# Patient Record
Sex: MALE | Race: WHITE | NOT HISPANIC OR LATINO | Employment: OTHER | RURAL
[De-identification: names, ages, dates, MRNs, and addresses within clinical notes are randomized per-mention and may not be internally consistent; named-entity substitution may affect disease eponyms.]

---

## 2018-07-05 ENCOUNTER — OFFICE VISIT CONVERTED (OUTPATIENT)
Dept: CARDIOLOGY | Facility: CLINIC | Age: 68
End: 2018-07-05
Attending: SPECIALIST

## 2018-07-05 ENCOUNTER — CONVERSION ENCOUNTER (OUTPATIENT)
Dept: OTHER | Facility: HOSPITAL | Age: 68
End: 2018-07-05

## 2018-11-08 ENCOUNTER — OFFICE VISIT CONVERTED (OUTPATIENT)
Dept: CARDIOLOGY | Facility: CLINIC | Age: 68
End: 2018-11-08
Attending: SPECIALIST

## 2019-05-20 ENCOUNTER — HOSPITAL ENCOUNTER (OUTPATIENT)
Dept: NUCLEAR MEDICINE | Facility: HOSPITAL | Age: 69
Discharge: HOME | End: 2019-05-20
Attending: NURSE PRACTITIONER

## 2019-05-23 ENCOUNTER — OFFICE VISIT CONVERTED (OUTPATIENT)
Dept: CARDIOLOGY | Facility: CLINIC | Age: 69
End: 2019-05-23
Attending: SPECIALIST

## 2019-05-23 ENCOUNTER — CONVERSION ENCOUNTER (OUTPATIENT)
Dept: OTHER | Facility: HOSPITAL | Age: 69
End: 2019-05-23

## 2019-07-24 ENCOUNTER — CONVERSION ENCOUNTER (OUTPATIENT)
Dept: NEUROLOGY | Facility: CLINIC | Age: 69
End: 2019-07-24

## 2019-07-24 ENCOUNTER — OFFICE VISIT CONVERTED (OUTPATIENT)
Dept: NEUROSURGERY | Facility: CLINIC | Age: 69
End: 2019-07-24
Attending: PHYSICIAN ASSISTANT

## 2019-08-20 ENCOUNTER — OFFICE VISIT CONVERTED (OUTPATIENT)
Dept: NEUROSURGERY | Facility: CLINIC | Age: 69
End: 2019-08-20
Attending: NEUROLOGICAL SURGERY

## 2019-09-12 ENCOUNTER — OFFICE VISIT CONVERTED (OUTPATIENT)
Dept: CARDIOLOGY | Facility: CLINIC | Age: 69
End: 2019-09-12
Attending: SPECIALIST

## 2019-09-12 ENCOUNTER — CONVERSION ENCOUNTER (OUTPATIENT)
Dept: CARDIOLOGY | Facility: CLINIC | Age: 69
End: 2019-09-12

## 2019-09-17 ENCOUNTER — CONVERSION ENCOUNTER (OUTPATIENT)
Dept: CARDIOLOGY | Facility: CLINIC | Age: 69
End: 2019-09-17
Attending: SPECIALIST

## 2019-12-16 ENCOUNTER — HOSPITAL ENCOUNTER (OUTPATIENT)
Dept: GENERAL RADIOLOGY | Facility: HOSPITAL | Age: 69
Discharge: HOME | End: 2019-12-16
Attending: INTERNAL MEDICINE

## 2020-03-09 ENCOUNTER — OFFICE VISIT CONVERTED (OUTPATIENT)
Dept: CARDIOLOGY | Facility: CLINIC | Age: 70
End: 2020-03-09
Attending: SPECIALIST

## 2020-07-23 ENCOUNTER — HOSPITAL ENCOUNTER (OUTPATIENT)
Dept: GENERAL RADIOLOGY | Facility: HOSPITAL | Age: 70
Discharge: HOME | End: 2020-07-23
Attending: INTERNAL MEDICINE

## 2020-07-23 LAB
CREAT BLD-MCNC: 1.2 MG/DL (ref 0.6–1.4)
GFR SERPLBLD BASED ON 1.73 SQ M-ARVRAT: >60 ML/MIN/{1.73_M2}

## 2021-05-15 VITALS
HEIGHT: 69 IN | WEIGHT: 195 LBS | DIASTOLIC BLOOD PRESSURE: 79 MMHG | BODY MASS INDEX: 28.88 KG/M2 | SYSTOLIC BLOOD PRESSURE: 128 MMHG

## 2021-05-15 VITALS
SYSTOLIC BLOOD PRESSURE: 137 MMHG | DIASTOLIC BLOOD PRESSURE: 80 MMHG | BODY MASS INDEX: 29.21 KG/M2 | WEIGHT: 197.25 LBS | HEIGHT: 69 IN | HEART RATE: 95 BPM

## 2021-05-15 VITALS
DIASTOLIC BLOOD PRESSURE: 82 MMHG | HEART RATE: 102 BPM | HEIGHT: 69 IN | WEIGHT: 189.12 LBS | SYSTOLIC BLOOD PRESSURE: 143 MMHG | BODY MASS INDEX: 28.01 KG/M2

## 2021-05-15 VITALS
DIASTOLIC BLOOD PRESSURE: 62 MMHG | HEART RATE: 70 BPM | SYSTOLIC BLOOD PRESSURE: 119 MMHG | BODY MASS INDEX: 29.18 KG/M2 | HEIGHT: 69 IN | WEIGHT: 197 LBS

## 2021-05-15 VITALS — BODY MASS INDEX: 28.14 KG/M2 | HEART RATE: 77 BPM | WEIGHT: 190 LBS | HEIGHT: 69 IN

## 2021-05-16 VITALS
SYSTOLIC BLOOD PRESSURE: 122 MMHG | BODY MASS INDEX: 27.7 KG/M2 | HEART RATE: 81 BPM | WEIGHT: 187 LBS | DIASTOLIC BLOOD PRESSURE: 78 MMHG | HEIGHT: 69 IN

## 2021-05-16 VITALS
HEIGHT: 69 IN | DIASTOLIC BLOOD PRESSURE: 87 MMHG | HEART RATE: 65 BPM | WEIGHT: 186 LBS | SYSTOLIC BLOOD PRESSURE: 151 MMHG | BODY MASS INDEX: 27.55 KG/M2

## 2021-08-09 ENCOUNTER — TELEPHONE (OUTPATIENT)
Dept: NEUROSURGERY | Facility: CLINIC | Age: 71
End: 2021-08-09
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
## 2021-08-10 NOTE — TELEPHONE ENCOUNTER
Patient not seen since 2019 and no new imaging. Can be scheduled with PA/NP next available or can see PCP and have new MRI ordered.

## 2021-08-10 NOTE — TELEPHONE ENCOUNTER
Caller: DANIEL    Relationship: WIFE    Best call back number: [phone]    What was the call regarding:   SET APPOINTMENT WITH MARIANNE IRIZARRY, PATIENT CANNOT HAVE AN MRI DUE TO PACEMAKER.

## 2021-09-15 ENCOUNTER — OFFICE VISIT (OUTPATIENT)
Dept: NEUROSURGERY | Facility: CLINIC | Age: 71
End: 2021-09-15

## 2021-09-15 VITALS
BODY MASS INDEX: 26.81 KG/M2 | SYSTOLIC BLOOD PRESSURE: 129 MMHG | HEART RATE: 59 BPM | DIASTOLIC BLOOD PRESSURE: 68 MMHG | HEIGHT: 69 IN | WEIGHT: 181 LBS

## 2021-09-15 DIAGNOSIS — D17.79 EPIDURAL LIPOMATOSIS: ICD-10-CM

## 2021-09-15 DIAGNOSIS — M54.2 CERVICALGIA: ICD-10-CM

## 2021-09-15 DIAGNOSIS — M47.812 CERVICAL SPONDYLOSIS WITHOUT MYELOPATHY: ICD-10-CM

## 2021-09-15 DIAGNOSIS — M54.12 CERVICAL RADICULOPATHY: ICD-10-CM

## 2021-09-15 DIAGNOSIS — M48.061 SPINAL STENOSIS, LUMBAR REGION, WITHOUT NEUROGENIC CLAUDICATION: Primary | ICD-10-CM

## 2021-09-15 PROCEDURE — 99215 OFFICE O/P EST HI 40 MIN: CPT | Performed by: NURSE PRACTITIONER

## 2021-09-15 RX ORDER — FLUCONAZOLE 100 MG/1
TABLET ORAL
COMMUNITY

## 2021-09-15 RX ORDER — LORATADINE 10 MG/1
10 TABLET ORAL DAILY
COMMUNITY
End: 2022-04-11

## 2021-09-15 RX ORDER — POTASSIUM CHLORIDE 750 MG/1
10 TABLET, FILM COATED, EXTENDED RELEASE ORAL EVERY OTHER DAY
COMMUNITY

## 2021-09-15 RX ORDER — CYANOCOBALAMIN 1000 UG/ML
INJECTION, SOLUTION INTRAMUSCULAR; SUBCUTANEOUS
COMMUNITY
Start: 2021-04-27

## 2021-09-15 RX ORDER — PENTOXIFYLLINE 400 MG/1
400 TABLET, EXTENDED RELEASE ORAL
COMMUNITY
Start: 2021-05-28

## 2021-09-15 RX ORDER — OMEPRAZOLE 20 MG/1
20 CAPSULE, DELAYED RELEASE ORAL
COMMUNITY

## 2021-09-15 RX ORDER — PREGABALIN 100 MG/1
100 CAPSULE ORAL 2 TIMES DAILY
COMMUNITY
Start: 2021-08-10

## 2021-09-15 RX ORDER — GABAPENTIN 300 MG/1
300 CAPSULE ORAL
COMMUNITY

## 2021-09-15 RX ORDER — CLINDAMYCIN PHOSPHATE 10 MG/ML
SOLUTION TOPICAL
COMMUNITY
Start: 2021-07-21

## 2021-09-15 RX ORDER — OXYMORPHONE HYDROCHLORIDE 5 MG/1
5 TABLET ORAL
COMMUNITY
Start: 2021-06-15 | End: 2021-10-14

## 2021-09-15 RX ORDER — FLUTICASONE PROPIONATE 50 MCG
SPRAY, SUSPENSION (ML) NASAL
COMMUNITY
Start: 2021-07-21

## 2021-09-15 RX ORDER — ROSUVASTATIN CALCIUM 5 MG/1
5 TABLET, COATED ORAL
COMMUNITY
Start: 2021-08-23

## 2021-09-15 RX ORDER — MODAFINIL 200 MG/1
200 TABLET ORAL
COMMUNITY
Start: 2021-06-05

## 2021-09-15 RX ORDER — LEVOTHYROXINE SODIUM 0.1 MG/1
100 TABLET ORAL DAILY
COMMUNITY
Start: 2021-08-27

## 2021-09-15 RX ORDER — METHOCARBAMOL 750 MG/1
750 TABLET, FILM COATED ORAL 4 TIMES DAILY
COMMUNITY

## 2021-09-15 RX ORDER — FUROSEMIDE 20 MG/1
20 TABLET ORAL
COMMUNITY
End: 2021-10-07 | Stop reason: SDUPTHER

## 2021-09-15 RX ORDER — PREDNISONE 50 MG/1
25 TABLET ORAL
COMMUNITY
End: 2021-10-10

## 2021-09-15 RX ORDER — DOXAZOSIN 8 MG/1
TABLET ORAL
COMMUNITY
Start: 2021-08-26

## 2021-09-15 RX ORDER — ZOLPIDEM TARTRATE 10 MG/1
10 TABLET ORAL NIGHTLY PRN
COMMUNITY
Start: 2021-07-21

## 2021-09-15 RX ORDER — DOXYCYCLINE HYCLATE 100 MG
TABLET ORAL
COMMUNITY

## 2021-09-15 RX ORDER — TESTOSTERONE CYPIONATE 200 MG/ML
INJECTION, SOLUTION INTRAMUSCULAR
COMMUNITY
Start: 2021-06-16

## 2021-09-15 RX ORDER — METOPROLOL SUCCINATE 50 MG/1
TABLET, EXTENDED RELEASE ORAL
COMMUNITY

## 2021-09-15 RX ORDER — LISINOPRIL 20 MG/1
TABLET ORAL
COMMUNITY
Start: 2021-08-02

## 2021-09-15 RX ORDER — TRIAMTERENE AND HYDROCHLOROTHIAZIDE 75; 50 MG/1; MG/1
TABLET ORAL
COMMUNITY

## 2021-09-15 RX ORDER — IVERMECTIN 3 MG/1
TABLET ORAL
COMMUNITY
Start: 2021-09-06 | End: 2022-04-11

## 2021-09-15 RX ORDER — MAGNESIUM OXIDE 400 MG/1
250 TABLET ORAL 2 TIMES DAILY
COMMUNITY

## 2021-09-15 RX ORDER — TRETINOIN 1 MG/G
CREAM TOPICAL
COMMUNITY
Start: 2021-08-01 | End: 2022-04-11

## 2021-09-15 NOTE — PROGRESS NOTES
"Chief Complaint  Back Pain    Subjective          Zain Fonseca who is a 71 y.o. year old male who presents to [facility] NEUROLOGY [facility] NEUROSURGERY for follow up of his low back pain and neck pain.    Pt last seen by Dr. Parekh in 2019. There was discussion at that time for MIL L3/4 and L4/5 pending cardiac clearance. He never had surgery due to cardiac and other health issues. He is followed by pain management, Dr. Lazo for medication management regarding his chronic back pain. He has received LESB in the past as well.     He presents today with concerns of low back and neck pain. Recently had a CT Lumbar Spine at [facility]. He brought the report though did not bring the disc. The report notes multilevel spondylosis with advanced DDD and facet disease, with moderately severe canal stenosis at L4/5 and L5/S1 progressed since his last imaging in 2016.     His pain is primarily in the low back, with aching pain into the thighs. Occasionally he will have radiating pain into the feet.     More recently his neck pain has increased. He is currently wearing a cervical collar today. Jarring motions, prolonged sitting, riding in cars, will increase his neck pain. Pain is severe at times. He has previously received cervical epidural injections.       Recent Interventions: LESB, CESB, medication management with pain management      Review of Systems   Musculoskeletal: Positive for arthralgias, back pain, gait problem, neck pain and neck stiffness.   Neurological: Positive for weakness and numbness.        Objective   Vital Signs:   /68   Pulse 59   Ht 175.3 cm (69\")   Wt 82.1 kg (181 lb)   BMI 26.73 kg/m²       Physical Exam  Vitals reviewed.   Constitutional:       Appearance: Normal appearance.   Musculoskeletal:      Cervical back: Tenderness present. Decreased range of motion.      Thoracic back: Tenderness present.      Lumbar back: Tenderness present. Negative right straight leg " raise test and negative left straight leg raise test.   Neurological:      Mental Status: He is alert and oriented to person, place, and time.      Deep Tendon Reflexes: Strength normal.      Reflex Scores:       Tricep reflexes are 0 on the right side and 0 on the left side.       Bicep reflexes are 1+ on the right side and 1+ on the left side.       Brachioradialis reflexes are 1+ on the right side and 1+ on the left side.       Patellar reflexes are 0 on the right side and 0 on the left side.       Achilles reflexes are 0 on the right side and 0 on the left side.       Neurologic Exam     Mental Status   Oriented to person, place, and time.   Level of consciousness: alert    Motor Exam   Muscle bulk: normal  Overall muscle tone: normal    Strength   Strength 5/5 throughout.     Sensory Exam   Light touch normal.     Gait, Coordination, and Reflexes     Gait  Gait: wide-based    Reflexes   Right brachioradialis: 1+  Left brachioradialis: 1+  Right biceps: 1+  Left biceps: 1+  Right triceps: 0  Left triceps: 0  Right patellar: 0  Left patellar: 0  Right achilles: 0  Left achilles: 0       Result Review :       Data reviewed: Radiologic studies CT Lumbar Spine report reviewed. multilevel spondylosis with advanced DDD and facet disease, with moderately severe canal stenosis at L4/5 and L5/S1 progressed since his last imaging in 2016.           Assessment and Plan    Diagnoses and all orders for this visit:    1. Spinal stenosis, lumbar region, without neurogenic claudication (Primary)    2. Epidural lipomatosis    3. Cervicalgia  -     CT Cervical Spine Without Contrast; Future    4. Cervical radiculopathy  -     CT Cervical Spine Without Contrast; Future    5. Cervical spondylosis without myelopathy  -     CT Cervical Spine Without Contrast; Future    Pt with chronic low back and neck pain. Regarding the low back, we reviewed his CT Lumbar Spine report, though do not have disc to review today. He will bring this to  his next appointment. He is progressed lumbar stenosis at L4/5 and L5/S1. His pain is primarily in the back. We discussed that likely surgery would not be recommended. Goal would be pain and symptom management. Could consider RFA trial.     Regarding his neck pain, he has not had a recent CT Cervical Spine. We will update his imaging to evaluate for possible surgical vs interventional recommendations. He will follow up in 6 weeks to review his imaging.     I spent 45 minutes caring for Zain on this date of service. This time includes time spent by me in the following activities:preparing for the visit, reviewing tests, obtaining and/or reviewing a separately obtained history, performing a medically appropriate examination and/or evaluation , counseling and educating the patient/family/caregiver, ordering medications, tests, or procedures, documenting information in the medical record and independently interpreting results and communicating that information with the patient/family/caregiver.    Follow Up   Return in about 6 weeks (around 10/27/2021).  Patient was given instructions and counseling regarding his condition or for health maintenance advice.     -CT Cervical Spine  -Follow up in 6 weeks

## 2021-10-06 PROBLEM — R06.02 SHORTNESS OF BREATH: Status: ACTIVE | Noted: 2021-10-06

## 2021-10-06 PROBLEM — R00.2 PALPITATIONS: Status: ACTIVE | Noted: 2021-10-06

## 2021-10-06 PROBLEM — I10 HYPERTENSION, ESSENTIAL: Status: ACTIVE | Noted: 2021-10-06

## 2021-10-06 NOTE — PROGRESS NOTES
[facility]  Cardiology progress Note    Patient Name: Zain Fonseca  : 1950    CHIEF COMPLAINT  Shortness of breath, sarcoidosis.      Subjective   Subjective     HISTORY OF PRESENT ILLNESS    Zain Fonseca is a 71 y.o. male Kalosis and shortness of breath.  Shortness of breath is stable.  No chest pain.    Review of Systems:   Constitutional no fever,  no weight loss   Skin no rash   Otolaryngeal no difficulty swallowing   Cardiovascular See HPI   Pulmonary no cough, no sputum production   Gastrointestinal no constipation, no diarrhea   Genitourinary no dysuria, no hematuria   Hematologic no easy bruisability, no abnormal bleeding   Musculoskeletal no muscle pain   Neurologic no dizziness, no falls         Personal History     Social History:  reports that he has never smoked. He has never used smokeless tobacco. He reports previous alcohol use. He reports that he does not use drugs.    Home Medications:  Current Outpatient Medications on File Prior to Visit   Medication Sig   • clindamycin (CLEOCIN T) 1 % swab    • cyanocobalamin 1000 MCG/ML injection INJECT 1 MILLILITER IN THE MUSCLE EVERY MONTH   • doxazosin (CARDURA) 8 MG tablet    • doxycycline (VIBRAMYICN) 100 MG tablet 1 tablet   • Dulera 100-5 MCG/ACT inhaler    • enoxaparin (Lovenox) 80 MG/0.8ML solution syringe 1 mg/kg.   • fluconazole (DIFLUCAN) 100 MG tablet fluconazole 100 mg oral tablet take 1 tablet (100 mg) by oral route once daily   Active   • fluticasone (FLONASE) 50 MCG/ACT nasal spray    • furosemide (LASIX) 20 MG tablet Take 20 mg by mouth.   • gabapentin (NEURONTIN) 300 MG capsule Take 300 mg by mouth 5 (Five) Times a Day.   • levothyroxine (SYNTHROID, LEVOTHROID) 100 MCG tablet Take 100 mcg by mouth Daily.   • lisinopril (PRINIVIL,ZESTRIL) 20 MG tablet    • loratadine (CLARITIN) 10 MG tablet Take 10 mg by mouth Daily.   • magnesium oxide (MAG-OX) 400 MG tablet Take 400 mg by mouth 2 (Two) Times a Day.   •  methocarbamol (ROBAXIN) 750 MG tablet Take 750 mg by mouth 4 (Four) Times a Day.   • metoprolol succinate XL (Toprol XL) 50 MG 24 hr tablet Toprol XL 50 mg oral tablet extended release 24 hr take 1 tablet (50 mg) by oral route once daily   Active   • modafinil (PROVIGIL) 200 MG tablet Take 200 mg by mouth.   • nystatin (MYCOSTATIN) 436043 UNIT/ML suspension TAKE 5 MILLILITERS BY MOUTH FOUR TIMES A DAY FOR MOUTH AND THROAT   • omeprazole (priLOSEC) 20 MG capsule Take 20 mg by mouth.   • oxyMORphone (OPANA) 5 MG tablet Take 5 mg by mouth 5 (Five) Times a Day.   • pentoxifylline (TRENtal) 400 MG CR tablet Take 400 mg by mouth 3 (Three) Times a Day With Meals.   • predniSONE (DELTASONE) 5 MG tablet Take 5 mg by mouth 5 (Five) Times a Day. 3 tabs in morning and 2 tabs in pm  Equals 25 mg daily   • rosuvastatin (CRESTOR) 5 MG tablet Take 5 mg by mouth every night at bedtime.   • Testosterone Cypionate (DEPOTESTOTERONE CYPIONATE) 200 MG/ML injection INJECT 0.25 MLS INTO THE MUSCLE EVERY WEEK   • tretinoin (RETIN-A) 0.1 % cream    • triamterene-hydrochlorothiazide (MAXZIDE) 75-50 MG per tablet triamterene-hydrochlorothiazid 75-50 mg oral tablet take 1 tablet by oral route once daily   Active   • vitamin D3 (vitamin d) 125 MCG (5000 UT) capsule capsule Take  by mouth Daily.   • zolpidem (AMBIEN) 10 MG tablet Take 10 mg by mouth At Night As Needed.   • ivermectin (STROMECTOL) 3 MG tablet tablet    • potassium chloride 10 MEQ CR tablet Take 10 mEq by mouth.   • predniSONE (DELTASONE) 50 MG tablet Take 25 mg by mouth.   • pregabalin (LYRICA) 100 MG capsule Take 100 mg by mouth 2 (Two) Times a Day.     No current facility-administered medications on file prior to visit.     Allergies:  Allergies   Allergen Reactions   • Amiodarone Other (See Comments) and Shortness Of Breath     Patient unable to articulate exact reaction.   Patient unable to articulate exact reaction.      • Azathioprine Other (See Comments) and Swelling      ulcers  Other reaction(s): Other (See Comments)  ulcers  Leg ulcers, extreme arthitis type symptoms all over body  ulcers  ulcers  Leg ulcers, extreme arthitis type symptoms all over body     • Hydroxychloroquine Other (See Comments)     Other reaction(s): Other (See Comments)  Retina toxcity  Retina toxcity  Other reaction(s): Other (See Comments)  Retina toxcity  Retina toxcity     • Methotrexate Derivatives Diarrhea, GI Intolerance and Nausea And Vomiting     Other reaction(s): GI Upset     • Terazosin Other (See Comments)   • Tolmetin Itching and Rash   • Sulfa Antibiotics Other (See Comments)   • Nsaids Rash       Objective    Objective       Vitals:   Heart Rate:  [92] 92  BP: (125)/(66) 125/66  Body mass index is 27.62 kg/m².     Physical Exam:   Constitutional: Awake, alert, No acute distress    Eyes: PERRLA, sclerae anicteric, no conjunctival injection   HENT: NCAT, mucous membranes moist   Neck: Supple, no thyromegaly, no lymphadenopathy, trachea midline   Respiratory: Clear to auscultation bilaterally, nonlabored respirations    Cardiovascular: RRR, no murmurs or rubs. Palpable pedal pulses bilaterally   Musculoskeletal: No bilateral ankle edema, no cyanosis to extremities   Psychiatric: Appropriate affect, cooperative   Neurologic: Oriented x 3, strength symmetric in all extremities, Cranial Nerves grossly intact to confrontation, speech clear   Skin: No rashes.    Result Review    Result Review:  I have personally reviewed the available results from  [x]  Laboratory  [x]  EKG  [x]  Cardiology  [x]  Medications  [x]  Old records  []  Other:   Procedures      Impression/Plan:  1.  Essential hypertension controlled: Continue lisinopril 20 mg once a day.  Monitor blood pressure regularly.  Blood pressure well controlled at home.  He is able to tolerate lisinopril.  2.  Hyperlipidemia: Continue Crestor 5 mg once a day.  Able to tolerate Crestor with no side effects.  3.  Presence of ICD: Last ICD check shows  ICD is functioning normally.  4.  Palpitations/tachycardia stable: Continue metoprolol ER 50 mg once a day.  5.  Chronic diastolic heart failure: Continue Lasix 20 mg once a day.  Low-salt diet advised.  Recent echocardiogram done at [facility] shows normal left ventricular systolic function.  I reviewed the report.        Mariano Mccray MD   10/07/21   14:51 EDT

## 2021-10-07 ENCOUNTER — OFFICE VISIT (OUTPATIENT)
Dept: CARDIOLOGY | Facility: CLINIC | Age: 71
End: 2021-10-07

## 2021-10-07 VITALS
BODY MASS INDEX: 27.7 KG/M2 | DIASTOLIC BLOOD PRESSURE: 66 MMHG | HEIGHT: 69 IN | SYSTOLIC BLOOD PRESSURE: 125 MMHG | HEART RATE: 92 BPM | WEIGHT: 187 LBS

## 2021-10-07 DIAGNOSIS — R00.2 PALPITATIONS: ICD-10-CM

## 2021-10-07 DIAGNOSIS — R06.02 SHORTNESS OF BREATH: ICD-10-CM

## 2021-10-07 DIAGNOSIS — I10 HYPERTENSION, ESSENTIAL: Primary | ICD-10-CM

## 2021-10-07 PROCEDURE — 99214 OFFICE O/P EST MOD 30 MIN: CPT | Performed by: SPECIALIST

## 2021-10-07 RX ORDER — FUROSEMIDE 20 MG/1
20 TABLET ORAL DAILY
Qty: 90 TABLET | Refills: 5 | Status: SHIPPED | OUTPATIENT
Start: 2021-10-07

## 2021-10-07 RX ORDER — PREDNISONE 1 MG/1
5 TABLET ORAL
COMMUNITY

## 2021-10-27 ENCOUNTER — OFFICE VISIT (OUTPATIENT)
Dept: NEUROSURGERY | Facility: CLINIC | Age: 71
End: 2021-10-27

## 2021-10-27 ENCOUNTER — LAB (OUTPATIENT)
Dept: LAB | Facility: HOSPITAL | Age: 71
End: 2021-10-27

## 2021-10-27 VITALS — WEIGHT: 183 LBS | HEIGHT: 69 IN | BODY MASS INDEX: 27.11 KG/M2

## 2021-10-27 DIAGNOSIS — M47.812 CERVICAL SPONDYLOSIS WITHOUT MYELOPATHY: ICD-10-CM

## 2021-10-27 DIAGNOSIS — M46.42 DISCITIS OF CERVICAL REGION: Primary | ICD-10-CM

## 2021-10-27 DIAGNOSIS — M46.42 DISCITIS OF CERVICAL REGION: ICD-10-CM

## 2021-10-27 LAB
BASOPHILS # BLD AUTO: 0.03 10*3/MM3 (ref 0–0.2)
BASOPHILS NFR BLD AUTO: 0.3 % (ref 0–1.5)
DEPRECATED RDW RBC AUTO: 57 FL (ref 37–54)
EOSINOPHIL # BLD AUTO: 0.03 10*3/MM3 (ref 0–0.4)
EOSINOPHIL NFR BLD AUTO: 0.3 % (ref 0.3–6.2)
ERYTHROCYTE [DISTWIDTH] IN BLOOD BY AUTOMATED COUNT: 16.2 % (ref 12.3–15.4)
ERYTHROCYTE [SEDIMENTATION RATE] IN BLOOD: 21 MM/HR (ref 0–20)
HCT VFR BLD AUTO: 40.3 % (ref 37.5–51)
HGB BLD-MCNC: 13.3 G/DL (ref 13–17.7)
IMM GRANULOCYTES # BLD AUTO: 0.08 10*3/MM3 (ref 0–0.05)
IMM GRANULOCYTES NFR BLD AUTO: 0.9 % (ref 0–0.5)
LYMPHOCYTES # BLD AUTO: 0.73 10*3/MM3 (ref 0.7–3.1)
LYMPHOCYTES NFR BLD AUTO: 7.9 % (ref 19.6–45.3)
MCH RBC QN AUTO: 31.5 PG (ref 26.6–33)
MCHC RBC AUTO-ENTMCNC: 33 G/DL (ref 31.5–35.7)
MCV RBC AUTO: 95.5 FL (ref 79–97)
MONOCYTES # BLD AUTO: 0.56 10*3/MM3 (ref 0.1–0.9)
MONOCYTES NFR BLD AUTO: 6.1 % (ref 5–12)
NEUTROPHILS NFR BLD AUTO: 7.77 10*3/MM3 (ref 1.7–7)
NEUTROPHILS NFR BLD AUTO: 84.5 % (ref 42.7–76)
NRBC BLD AUTO-RTO: 0.1 /100 WBC (ref 0–0.2)
PLATELET # BLD AUTO: 182 10*3/MM3 (ref 140–450)
PMV BLD AUTO: 10.3 FL (ref 6–12)
RBC # BLD AUTO: 4.22 10*6/MM3 (ref 4.14–5.8)
WBC # BLD AUTO: 9.2 10*3/MM3 (ref 3.4–10.8)

## 2021-10-27 PROCEDURE — 85652 RBC SED RATE AUTOMATED: CPT

## 2021-10-27 PROCEDURE — 36415 COLL VENOUS BLD VENIPUNCTURE: CPT

## 2021-10-27 PROCEDURE — 85025 COMPLETE CBC W/AUTO DIFF WBC: CPT

## 2021-10-27 PROCEDURE — 99214 OFFICE O/P EST MOD 30 MIN: CPT | Performed by: NURSE PRACTITIONER
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
## 2021-12-07 ENCOUNTER — TELEPHONE (OUTPATIENT)
Dept: CARDIOLOGY | Facility: CLINIC | Age: 71
End: 2021-12-07

## 2021-12-07 NOTE — TELEPHONE ENCOUNTER
Procedure: Removal of Hernia Mesh    Med Directive: NA    PMH: HTN, hyperlipidemia, CHF, palpitations    Last Seen 10/07/2021    9/16/21 echo normal

## 2021-12-09 ENCOUNTER — OFFICE VISIT (OUTPATIENT)
Dept: NEUROSURGERY | Facility: CLINIC | Age: 71
End: 2021-12-09

## 2021-12-09 VITALS
BODY MASS INDEX: 27.99 KG/M2 | WEIGHT: 189 LBS | HEIGHT: 69 IN | HEART RATE: 92 BPM | SYSTOLIC BLOOD PRESSURE: 138 MMHG | DIASTOLIC BLOOD PRESSURE: 69 MMHG

## 2021-12-09 DIAGNOSIS — D17.79 EPIDURAL LIPOMATOSIS: ICD-10-CM

## 2021-12-09 DIAGNOSIS — Z79.01 CHRONIC ANTICOAGULATION: ICD-10-CM

## 2021-12-09 DIAGNOSIS — M47.812 CERVICAL SPONDYLOSIS WITHOUT MYELOPATHY: ICD-10-CM

## 2021-12-09 DIAGNOSIS — M47.27 OSTEOARTHRITIS OF SPINE WITH RADICULOPATHY, LUMBOSACRAL REGION: ICD-10-CM

## 2021-12-09 DIAGNOSIS — M48.061 SPINAL STENOSIS, LUMBAR REGION, WITHOUT NEUROGENIC CLAUDICATION: Primary | ICD-10-CM

## 2021-12-09 PROCEDURE — 99215 OFFICE O/P EST HI 40 MIN: CPT | Performed by: NURSE PRACTITIONER

## 2021-12-09 RX ORDER — OXYMORPHONE HYDROCHLORIDE 5 MG/1
5 TABLET ORAL
COMMUNITY
Start: 2021-12-11 | End: 2022-01-10
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
## 2022-01-17 ENCOUNTER — TELEPHONE (OUTPATIENT)
Dept: NEUROSURGERY | Facility: CLINIC | Age: 72
End: 2022-01-17

## 2022-01-17 NOTE — TELEPHONE ENCOUNTER
Caller: DANIEL SUH    Relationship to patient: Emergency Contact    Best call back number:[phone]  Patient is needing:  PATIENT IS CALLING TO CLARIFY    LAST OV NOTES 12/09/21 STATES PATIENT NEEDS CT MYELOGRAM COMPLETED TO SCHEDULE.  NO ORDER INCHART.   Will proceed with CT Myelogram of the Lumbar Spine to evaluate for surgical recommendations. He will follow up with Dr. Parekh in 6 weeks.   PLEASE CALL PATIENT TO SCHEDULE CT MYELOGRAM AND FOLLOW UP APPT.    THANK YOU       SORRY FOR THE DUPLICATE NOTE ON THE DAY.       ER Documentation


Chief Complaint


Chief Complaint


Left ankle pain x 1 hour, fell off skateboard





HPI


This 15-year-old male presents with left ankle pain for an hour.  He was 

skateboarding when he fell and twisted his ankle outward.  He has put weight on 

the foot but is not ambulated because he Shay had crutches at home from prior 

fractures.  Coming by his father.  No other injuries or head injury was 

sustained.





ROS


All systems reviewed and are negative except as per history of present illness.





Medications


Home Meds


Active Scripts


Naproxen* (Naproxen*) 500 Mg Tablet, 500 MG PO BID Y for PAIN, #20 TAB


   Prov:MERONWILLIAMBERKLEY PARSONS         11/25/17


Ibuprofen* (Motrin*) 600 Mg Tab, 600 MG PO Q6H Y for PAIN AND OR ELEVATED TEMP, 

#30 TAB


   Prov:JACOB WERNER NP         10/18/17


Acetaminophen* (Tylenol*) 325 Mg Tablet, 2 TAB PO Q4 Y for PAIN AND OR ELEVATED 

TEMP, #30 TAB


   Prov:ABDIAZIZ MCKEON PA-C         12/17/16


Ibuprofen* (Ibuprofen*) 400 Mg Tablet, 400 MG PO Q6H Y for PAIN, #30 TAB


   Prov:ABDIAZIZ MCKEON PA-C         12/17/16


Oseltamivir Phosphate* (Tamiflu*) 75 Mg Capsule, 75 MG PO BID for 5 Days, CAP


   Prov:ABDIAZIZ MCKEON PA-C         12/17/16


Ibuprofen* (Motrin*) 400 Mg Tab, 400 MG PO Q6H Y for PAIN AND OR ELEVATED TEMP, 

#30 TAB


   Prov:ABDIAZIZ MCKEON PA-C         6/24/16


Acetaminophen* (Tylophen*) 500 Mg Capsule, 1 CAP PO Q4 Y for PAIN AND OR 

ELEVATED TEMP, #30 CAP


   Prov:ABDIAZIZ MCKEON PA-C         6/24/16





Allergies


Allergies:  


Coded Allergies:  


     No Known Allergy (Unverified , 11/25/17)





PMhx/Soc


Medical and Surgical Hx:  pt denies Medical Hx, pt denies Surgical Hx


History of Surgery:  No


Anesthesia Reaction:  No


Hx Neurological Disorder:  Yes (Seizure)


Hx Respiratory Disorders:  Yes (Croup)


Hx Cardiac Disorders:  No


Hx Psychiatric Problems:  No


Hx Miscellaneous Medical Probl:  No


Hx Alcohol Use:  No


Hx Substance Use:  No


Hx Tobacco Use:  No


Smoking Status:  Never smoker





Physical Exam


Vitals





Vital Signs








  Date Time  Temp Pulse Resp B/P Pulse Ox O2 Delivery O2 Flow Rate FiO2


 


11/25/17 14:49 98.8 77 16 117/56 97   








Physical Exam


Const:  []            L distress


Head:   Atraumatic 


Eyes:    Normal Conjunctiva


ENT:    Normal External Ears, Nose and Mouth.


Ext:    No cyanosis, lateral ankle swelling with tenderness around the 

malleolus.  Distal pulses intact with good capillary refill and no tenderness 

of bones of the foot.  Her function and sensory function intact.


Neur:    Awake and alert


Psych:    Normal Mood and Affect


Results 24 hrs





 Current Medications








 Medications


  (Trade)  Dose


 Ordered  Sig/Nestor


 Route


 PRN Reason  Start Time


 Stop Time Status Last Admin


Dose Admin


 


 Ibuprofen


  (Motrin)  600 mg  ONCE  ONCE


 PO


   11/25/17 16:30


 11/25/17 16:31 DC 11/25/17 16:27


 


 


 Acetaminophen/


 Hydrocodone Bitart


  (Norco (5/325))  1 tab  ONCE  ONCE


 PO


   11/25/17 17:30


 11/25/17 17:31 DC 11/25/17 17:22


 











Procedures/MDM


Avulsion fracture of left ankle.  Lucency seen in distal fibula on x-ray may 

represent an old healed fracture versus new fracture.  He was splinted and 

posterior molar mold/stirrup splint.  





Splint application, left ankle: I perform neurovascular assessment after splint 

application patient was neurovascularly intact.  Motor function and good 

capillary refill.





Left ankle x-ray interpretation: Avulsion fracture of left fibula with possible 

fracture of the distal left fibula as well.  No other fracture dislocation seen 

mild soft tissue swelling.





Departure


Diagnosis:  


 Primary Impression:  


 Fracture of distal fibula


 Additional Impression:  


 Avulsion fracture of ankle


Condition:  Stable


Patient Instructions:  Ankle Fracture (Distal Fibula), Closed





Additional Instructions:  


Call your primary care doctor TOMORROW for an appointment with an orthopedist.  

You do not need to see a primary care doctor.  See the doctor sooner or return 

here if your condition worsens before your appointment time.











WILLIAM CHANCE DO Nov 25, 2017 18:25

## 2022-01-17 NOTE — TELEPHONE ENCOUNTER
Caller:  MRS SUH    Relationship: SELF     Best call back number:        What is the best time to reach you:     Who are you requesting to speak with (clinical staff, provider,  specific staff member):     Do you know the name of the person who called:     What was the call regarding    PT HAS APPT WITH JONAS DIAZ ON 1/20/22 BUT PT NEED TO CLARIFY WHAT TESTING IS NEEDED BEFORE APPT.    PT HAD CT CERVICAL SPINE WITHOUT CONTRAST ON 9/30/21 AT [facility].     PT NAMED SEVERAL TESTS THAT WERE NEEDED.    PLEASE CALL AND ADVISE    THANK YOU

## 2022-01-18 DIAGNOSIS — M48.061 SPINAL STENOSIS, LUMBAR REGION, WITHOUT NEUROGENIC CLAUDICATION: Primary | ICD-10-CM

## 2022-01-18 DIAGNOSIS — D17.79 EPIDURAL LIPOMATOSIS: ICD-10-CM

## 2022-01-18 DIAGNOSIS — M47.27 OSTEOARTHRITIS OF SPINE WITH RADICULOPATHY, LUMBOSACRAL REGION: ICD-10-CM

## 2022-01-18 NOTE — TELEPHONE ENCOUNTER
Order has been corrected. Please schedule patient for IR CT Myelogram Lumbar Spine and have him follow up with Dr. Parekh after he has had imaging.

## 2022-01-31 RX ORDER — OXYMORPHONE HYDROCHLORIDE 5 MG/1
5 TABLET, FILM COATED, EXTENDED RELEASE ORAL EVERY 12 HOURS
COMMUNITY
End: 2022-01-31 | Stop reason: HOSPADM

## 2022-02-01 ENCOUNTER — HOSPITAL ENCOUNTER (OUTPATIENT)
Dept: INTERVENTIONAL RADIOLOGY/VASCULAR | Facility: HOSPITAL | Age: 72
End: 2022-02-01

## 2022-02-08 ENCOUNTER — TELEPHONE (OUTPATIENT)
Dept: NEUROSURGERY | Facility: CLINIC | Age: 72
End: 2022-02-08

## 2022-02-08 DIAGNOSIS — R06.02 SHORTNESS OF BREATH: Primary | ICD-10-CM

## 2022-02-08 DIAGNOSIS — I10 HYPERTENSION, ESSENTIAL: ICD-10-CM

## 2022-02-08 DIAGNOSIS — R00.2 PALPITATIONS: ICD-10-CM

## 2022-02-08 DIAGNOSIS — Z01.812 PRE-PROCEDURAL LABORATORY EXAMINATION: ICD-10-CM

## 2022-02-08 NOTE — TELEPHONE ENCOUNTER
"Patient's wife left voicemail stating she was advised patient would need \"coagulation labs\" prior to myelogram on 2-10-22. Spoke with radiology and patient will need cbc, pt, ptt prior to testing. Orders placed and wife notified.  "

## 2022-02-08 NOTE — NURSING NOTE
Medications reviewed with pt's wife, instructed which meds needed to be held 48hrs pre procedure- Robaxin, Ambien, and Lovenox to be held 24 hours.  Voiced understanding.

## 2022-02-10 ENCOUNTER — HOSPITAL ENCOUNTER (OUTPATIENT)
Dept: INTERVENTIONAL RADIOLOGY/VASCULAR | Facility: HOSPITAL | Age: 72
Discharge: HOME | End: 2022-02-10

## 2022-02-10 ENCOUNTER — HOSPITAL ENCOUNTER (OUTPATIENT)
Dept: CT IMAGING | Facility: HOSPITAL | Age: 72
Discharge: HOME | End: 2022-02-10

## 2022-02-10 VITALS
RESPIRATION RATE: 16 BRPM | DIASTOLIC BLOOD PRESSURE: 56 MMHG | OXYGEN SATURATION: 95 % | HEART RATE: 71 BPM | SYSTOLIC BLOOD PRESSURE: 117 MMHG

## 2022-02-10 DIAGNOSIS — M48.061 SPINAL STENOSIS, LUMBAR REGION, WITHOUT NEUROGENIC CLAUDICATION: ICD-10-CM

## 2022-02-10 DIAGNOSIS — Z01.812 PRE-PROCEDURAL LABORATORY EXAMINATION: ICD-10-CM

## 2022-02-10 DIAGNOSIS — R06.02 SHORTNESS OF BREATH: ICD-10-CM

## 2022-02-10 DIAGNOSIS — R00.2 PALPITATIONS: ICD-10-CM

## 2022-02-10 DIAGNOSIS — I10 HYPERTENSION, ESSENTIAL: ICD-10-CM

## 2022-02-10 DIAGNOSIS — M48.061 SPINAL STENOSIS OF LUMBAR REGION WITHOUT NEUROGENIC CLAUDICATION: ICD-10-CM

## 2022-02-10 DIAGNOSIS — D17.79 EPIDURAL LIPOMATOSIS: ICD-10-CM

## 2022-02-10 DIAGNOSIS — M47.27 OSTEOARTHRITIS OF SPINE WITH RADICULOPATHY, LUMBOSACRAL REGION: ICD-10-CM

## 2022-02-10 LAB
APTT PPP: 21.6 SECONDS (ref 22.2–34.2)
BASOPHILS # BLD AUTO: 0.02 10*3/MM3 (ref 0–0.2)
BASOPHILS NFR BLD AUTO: 0.2 % (ref 0–1.5)
DEPRECATED RDW RBC AUTO: 61 FL (ref 37–54)
EOSINOPHIL # BLD AUTO: 0.04 10*3/MM3 (ref 0–0.4)
EOSINOPHIL NFR BLD AUTO: 0.5 % (ref 0.3–6.2)
ERYTHROCYTE [DISTWIDTH] IN BLOOD BY AUTOMATED COUNT: 17.6 % (ref 12.3–15.4)
HCT VFR BLD AUTO: 42.3 % (ref 37.5–51)
HGB BLD-MCNC: 13.7 G/DL (ref 13–17.7)
IMM GRANULOCYTES # BLD AUTO: 0.14 10*3/MM3 (ref 0–0.05)
IMM GRANULOCYTES NFR BLD AUTO: 1.6 % (ref 0–0.5)
INR PPP: 0.92 (ref 2–3)
LYMPHOCYTES # BLD AUTO: 0.92 10*3/MM3 (ref 0.7–3.1)
LYMPHOCYTES NFR BLD AUTO: 10.5 % (ref 19.6–45.3)
MCH RBC QN AUTO: 31.1 PG (ref 26.6–33)
MCHC RBC AUTO-ENTMCNC: 32.4 G/DL (ref 31.5–35.7)
MCV RBC AUTO: 95.9 FL (ref 79–97)
MONOCYTES # BLD AUTO: 0.52 10*3/MM3 (ref 0.1–0.9)
MONOCYTES NFR BLD AUTO: 5.9 % (ref 5–12)
NEUTROPHILS NFR BLD AUTO: 7.16 10*3/MM3 (ref 1.7–7)
NEUTROPHILS NFR BLD AUTO: 81.3 % (ref 42.7–76)
NRBC BLD AUTO-RTO: 0 /100 WBC (ref 0–0.2)
PLATELET # BLD AUTO: 167 10*3/MM3 (ref 140–450)
PMV BLD AUTO: 10.1 FL (ref 6–12)
PROTHROMBIN TIME: 9.8 SECONDS (ref 9.4–12)
RBC # BLD AUTO: 4.41 10*6/MM3 (ref 4.14–5.8)
WBC NRBC COR # BLD: 8.8 10*3/MM3 (ref 3.4–10.8)

## 2022-02-10 PROCEDURE — 0 IOPAMIDOL 41 % SOLUTION: Performed by: NURSE PRACTITIONER

## 2022-02-10 PROCEDURE — 72240 MYELOGRAPHY NECK SPINE: CPT

## 2022-02-10 PROCEDURE — 85025 COMPLETE CBC W/AUTO DIFF WBC: CPT | Performed by: NURSE PRACTITIONER

## 2022-02-10 PROCEDURE — 85610 PROTHROMBIN TIME: CPT | Performed by: NURSE PRACTITIONER

## 2022-02-10 PROCEDURE — 72132 CT LUMBAR SPINE W/DYE: CPT

## 2022-02-10 PROCEDURE — 62304 MYELOGRAPHY LUMBAR INJECTION: CPT

## 2022-02-10 PROCEDURE — 85730 THROMBOPLASTIN TIME PARTIAL: CPT | Performed by: NURSE PRACTITIONER

## 2022-02-10 RX ORDER — LIDOCAINE HYDROCHLORIDE 20 MG/ML
INJECTION, SOLUTION INFILTRATION; PERINEURAL
Status: COMPLETED
Start: 2022-02-10 | End: 2022-02-10

## 2022-02-10 RX ADMIN — LIDOCAINE HYDROCHLORIDE 10 ML: 20 INJECTION, SOLUTION INFILTRATION; PERINEURAL at 10:30

## 2022-02-10 RX ADMIN — IOPAMIDOL 20 ML: 408 INJECTION, SOLUTION INTRATHECAL at 10:20

## 2022-02-10 NOTE — DISCHARGE INSTRUCTIONS
Myelogram    A myelogram is an imaging test. This test checks for problems in the spinal cord and the places where nerves attach to the spinal cord (nerve roots).  A dye (contrast material) is put into your spine before the X-ray. This provides a clearer image for your doctor to see.  You may need this test if you have a spinal cord problem that cannot be diagnosed with other imaging tests. You may also have this test to check your spine after surgery.  Tell a doctor about:  · Any allergies you have, especially to iodine.  · All medicines you are taking, including vitamins, herbs, eye drops, creams, and over-the-counter medicines.  · Any problems you or family members have had with anesthetic medicines or dye.  · Any blood disorders you have.  · Any surgeries you have had.  · Any medical conditions you have or have had, including asthma.  · Whether you are pregnant or may be pregnant.  What are the risks?  Generally, this is a safe procedure. However, problems may occur, including:  · Infection.  · Bleeding.  · Allergic reaction to medicines or dyes.  · Damage to your spinal cord or nerves.  · Leaking of spinal fluid. This can cause a headache.  · Damage to kidneys.  · Seizures. This is rare.  What happens before the procedure?  · Follow instructions from your doctor about what you cannot eat or drink. You may be asked to drink more fluids.  · Ask your doctor about changing or stopping your normal medicines. This is important if you take diabetes medicines or blood thinners.  · Plan to have someone take you home from the hospital or clinic.  · If you will be going home right after the procedure, plan to have someone with you for 24 hours.  What happens during the procedure?  · You will lie face down on a table.  · Your doctor will find the best injection site on your spine. This is most often in the lower back.  · This area will be washed with soap.  · You will be given a medicine to numb the area (local  anesthetic).  · Your doctor will place a long needle into the space around your spinal cord.  · A sample of spinal fluid may be taken. This may be sent to the lab for testing.  · The dye will be injected into the space around your spinal cord.  · The exam table may be tilted. This helps the dye flow up or down your spine.  · The X-ray will take images of your spinal cord.  · A bandage (dressing) may be placed over the area where the dye was injected.  The procedure may vary among doctors and hospitals.  What can I expect after the procedure?  · You may be monitored until you leave the hospital or clinic. This includes checking your blood pressure, heart rate, breathing rate, and blood oxygen level.  · You may feel sore at the injection site. You may have a mild headache.  · You will be told to lie flat with your head raised (elevated). This lowers the risk of a headache.  · It is up to you to get the results of your procedure. Ask your doctor, or the department that is doing the procedure, when your results will be ready.  Follow these instructions at home:    · Rest as told by your doctor. Lie flat with your head slightly elevated.  · Do not bend, lift, or do hard work for 24-48 hours, or as told by your doctor.  · Take over-the-counter and prescription medicines only as told by your doctor.  · Take care of your bandage as told by your doctor.  · Drink enough fluid to keep your pee (urine) pale yellow.  · Bathe or shower as told by your doctor.  Contact a doctor if:  · You have a fever.  · You have a headache that lasts longer than 24 hours.  · You feel sick to your stomach (nauseous).  · You vomit.  · Your neck is stiff.  · Your legs feel numb.  · You cannot pee.  · You cannot poop (no bowel movement).  · You have a rash.  · You are itchy or sneezing.  Get help right away if:  · You have new symptoms or your symptoms get worse.  · You have a seizure.  · You have trouble breathing.  Summary  · A myelogram is an  imaging test that checks for problems in the spinal cord and the places where nerves attach to the spinal cord (nerve roots).  · Before the procedure, follow instructions from your doctor. You will be told what not to eat or drink, or what medicines to change or stop.  · After the procedure, you will be told to lie flat with your head raised (elevated). This will lower your risk of a headache.  · Do not bend, lift, or do any hard work for 24-48 hours, or as told by your doctor.  · Contact a doctor if you have a stiff neck or numb legs. Get help right away if your symptoms get worse, or you have a seizure or trouble breathing.  This information is not intended to replace advice given to you by your health care provider. Make sure you discuss any questions you have with your health care provider.  Document Revised: 02/26/2020 Document Reviewed: 02/27/2020  Elsevier Patient Education © 2021 [employer]

## 2022-02-10 NOTE — NURSING NOTE
Arrived to rad holding via stretcher post myelogram per NICK Denton, vitals stable, denies pain/discomfort at site, bandaid intact with no visible drainage.  Was given soda and crackers/peanut butter for snack.  Informed will monitor for 2 hrs then discharge home if no issues.  Voiced understanding.

## 2022-02-14 NOTE — PROGRESS NOTES
CT Myelogram revealing multilevel degenerative changes. Pt needs to schedule a follow up with Dr. Parekh.

## 2022-03-07 ENCOUNTER — TELEPHONE (OUTPATIENT)
Dept: NEUROSURGERY | Facility: CLINIC | Age: 72
End: 2022-03-07

## 2022-03-07 NOTE — TELEPHONE ENCOUNTER
Caller: DANIEL SUH    Relationship: Emergency Contact    Best call back number: [phone]    What form or medical record are you requesting: RECENT BLOOD WORK    Who is requesting this form or medical record from you: [facility] (DR COSTA)    How would you like to receive the form or medical records (pick-up, mail, fax): FAX  If fax, what is the fax number: [phone]    Timeframe paperwork needed: ASAP (APPT 3/9/22)    Additional notes: PATIENT'S SPOUSE STATES [facility] NEEDS BLOOD WORK PRIOR TO HIS APPT ON Wednesday, BUT IT IS ALRIGHT IF BLOOD WORK FROM ANOTHER PROVIDER IS FAXED INSTEAD. THIS WILL NEED TO BE FAXED BEFORE HIS APPT - SENDING HIGH PRIORITY DUE TO TIME CONSTRAINT. PATIENT'S WIFE ALSO STATES [facility] CAN BE FAXED.

## 2022-03-24 ENCOUNTER — OFFICE VISIT (OUTPATIENT)
Dept: NEUROSURGERY | Facility: CLINIC | Age: 72
End: 2022-03-24

## 2022-03-24 VITALS
HEIGHT: 69 IN | BODY MASS INDEX: 27.42 KG/M2 | SYSTOLIC BLOOD PRESSURE: 133 MMHG | WEIGHT: 185.1 LBS | DIASTOLIC BLOOD PRESSURE: 74 MMHG

## 2022-03-24 DIAGNOSIS — M48.062 SPINAL STENOSIS OF LUMBAR REGION WITH NEUROGENIC CLAUDICATION: Primary | ICD-10-CM

## 2022-03-24 PROCEDURE — 99214 OFFICE O/P EST MOD 30 MIN: CPT | Performed by: NEUROLOGICAL SURGERY

## 2022-03-24 NOTE — PROGRESS NOTES
Zain Fonseca is a 71 y.o. male that presents with Back Pain       He had his CT myelogram. He has pain in the lower back which has progressively worsened. He has some pain into the legs. He no longer gets relief laying in the bed. He has pain that radiates into the bilateral lower extremities.       Review of Systems   Musculoskeletal: Positive for back pain and myalgias.        Vitals:    03/24/22 1301   BP: 133/74        Physical Exam  Cardiovascular:      Comments: Minimal edema  Pulmonary:      Effort: Pulmonary effort is normal.   Neurological:      Mental Status: He is alert.   Psychiatric:         Mood and Affect: Mood normal.        I have personally reviewed the CT myelogram images which show moderate stenosis most notably at L4-5 and L5-S1. He has epidural lipomatosis notably at L5-S1.        Assessment and Plan {CC Problem List  Visit Diagnosis  ROS  Review (Popup)  [facility] Maintenance  Quality  BestPractice  Medications  SmartSets  SnapShot Encounters  Media :23}   Problem List Items Addressed This Visit    None     Visit Diagnoses     Spinal stenosis of lumbar region with neurogenic claudication    -  Primary      He could consider a left approach for L4-5 and L5-S1 with epidural lipomatosis resection.     He will need cardiology clearance (Glenn Santos, but see them locally) and his pulmonologist (Elian SalmeronCove).    Follow Up {Instructions Charge Capture  Follow-up Communications :23}   No follow-ups on file.

## 2022-04-10 NOTE — PROGRESS NOTES
[facility]  Cardiology progress Note    Patient Name: Zain Fonseca  : 1950    CHIEF COMPLAINT  Hypertension, sarcoidosis      Subjective   Subjective     HISTORY OF PRESENT ILLNESS    Zain Fonseca is a 71 y.o. male with history of sarcoidosis and shortness of breath.  Shortness of breath is stable.  He has history of ICD implantation and palpitations.  Palpitations have improved.    Review of Systems:   Constitutional no fever,  no weight loss   Skin no rash   Otolaryngeal no difficulty swallowing   Cardiovascular See HPI   Pulmonary no cough, no sputum production   Gastrointestinal no constipation, no diarrhea   Genitourinary no dysuria, no hematuria   Hematologic no easy bruisability, no abnormal bleeding   Musculoskeletal no muscle pain   Neurologic no dizziness, no falls         Personal History     Social History:  reports that he has never smoked. He has never used smokeless tobacco. He reports previous alcohol use. He reports that he does not use drugs.    Home Medications:  Current Outpatient Medications on File Prior to Visit   Medication Sig   • clindamycin (CLEOCIN T) 1 % swab    • cyanocobalamin 1000 MCG/ML injection INJECT 1 MILLILITER IN THE MUSCLE EVERY MONTH   • doxazosin (CARDURA) 8 MG tablet    • doxycycline (VIBRAMYICN) 100 MG tablet 1 tablet   • Dulera 100-5 MCG/ACT inhaler    • enoxaparin (LOVENOX) 80 MG/0.8ML solution syringe 1 mg/kg.   • fluconazole (DIFLUCAN) 100 MG tablet fluconazole 100 mg oral tablet take 1 tablet (100 mg) by oral route once daily   Active   • fluticasone (FLONASE) 50 MCG/ACT nasal spray    • furosemide (LASIX) 20 MG tablet Take 1 tablet by mouth Daily. (Patient taking differently: Take 40 mg by mouth Daily.)   • gabapentin (NEURONTIN) 300 MG capsule Take 300 mg by mouth 5 (Five) Times a Day.   • levothyroxine (SYNTHROID, LEVOTHROID) 100 MCG tablet Take 100 mcg by mouth Daily.   • lisinopril (PRINIVIL,ZESTRIL) 20 MG tablet    • magnesium oxide  (MAG-OX) 400 MG tablet Take 250 mg by mouth 2 (Two) Times a Day.   • methocarbamol (ROBAXIN) 750 MG tablet Take 750 mg by mouth 4 (Four) Times a Day.   • metoprolol succinate XL (TOPROL-XL) 50 MG 24 hr tablet Toprol XL 50 mg oral tablet extended release 24 hr take 1 tablet (50 mg) by oral route once daily   Active   • modafinil (PROVIGIL) 200 MG tablet Take 200 mg by mouth.   • omeprazole (priLOSEC) 20 MG capsule Take 20 mg by mouth.   • pentoxifylline (TRENtal) 400 MG CR tablet Take 400 mg by mouth 3 (Three) Times a Day With Meals.   • potassium chloride 10 MEQ CR tablet Take 10 mEq by mouth Every Other Day.   • predniSONE (DELTASONE) 5 MG tablet Take 5 mg by mouth 5 (Five) Times a Day. 3 tabs in morning and 2 tabs in pm  Equals 25 mg daily   • pregabalin (LYRICA) 100 MG capsule Take 100 mg by mouth 2 (Two) Times a Day.   • rosuvastatin (CRESTOR) 5 MG tablet Take 5 mg by mouth every night at bedtime.   • Testosterone Cypionate (DEPOTESTOTERONE CYPIONATE) 200 MG/ML injection INJECT 0.25 MLS INTO THE MUSCLE EVERY WEEK   • triamterene-hydrochlorothiazide (MAXZIDE) 75-50 MG per tablet triamterene-hydrochlorothiazid 75-50 mg oral tablet take 1 tablet by oral route once daily   Active   • vitamin D3 125 MCG (5000 UT) capsule capsule Take  by mouth Daily.   • zolpidem (AMBIEN) 10 MG tablet Take 10 mg by mouth At Night As Needed.   • [DISCONTINUED] ivermectin (STROMECTOL) 3 MG tablet tablet    • [DISCONTINUED] loratadine (CLARITIN) 10 MG tablet Take 10 mg by mouth Daily.   • [DISCONTINUED] nystatin (MYCOSTATIN) 048205 UNIT/ML suspension TAKE 5 MILLILITERS BY MOUTH FOUR TIMES A DAY FOR MOUTH AND THROAT   • [DISCONTINUED] tretinoin (RETIN-A) 0.1 % cream      No current facility-administered medications on file prior to visit.     Allergies:  Allergies   Allergen Reactions   • Amiodarone Other (See Comments) and Shortness Of Breath     Patient unable to articulate exact reaction.   Patient unable to articulate exact reaction.       • Azathioprine Other (See Comments) and Swelling     ulcers  Other reaction(s): Other (See Comments)  ulcers  Leg ulcers, extreme arthitis type symptoms all over body  ulcers  ulcers  Leg ulcers, extreme arthitis type symptoms all over body     • Hydroxychloroquine Other (See Comments)     Other reaction(s): Other (See Comments)  Retina toxcity  Retina toxcity  Other reaction(s): Other (See Comments)  Retina toxcity  Retina toxcity     • Methotrexate Derivatives Diarrhea, GI Intolerance and Nausea And Vomiting     Other reaction(s): GI Upset     • Terazosin Other (See Comments)   • Tolmetin Itching and Rash   • Nsaids Rash       Objective    Objective       Vitals:   Heart Rate:  [86] 86  BP: (127)/(69) 127/69  Body mass index is 27.17 kg/m².     Physical Exam:   Constitutional: Awake, alert, No acute distress    Eyes: PERRLA, sclerae anicteric, no conjunctival injection   HENT: NCAT, mucous membranes moist   Neck: Supple, no thyromegaly, no lymphadenopathy, trachea midline   Respiratory: Clear to auscultation bilaterally, nonlabored respirations    Cardiovascular: RRR, no murmurs or rubs. Palpable pedal pulses bilaterally   Musculoskeletal: No bilateral ankle edema, no cyanosis to extremities   Psychiatric: Appropriate affect, cooperative   Neurologic: Oriented x 3, strength symmetric in all extremities, Cranial Nerves grossly intact to confrontation, speech clear   Skin: No rashes.    Result Review    Result Review:  I have personally reviewed the available results from  [x]  Laboratory  [x]  EKG  [x]  Cardiology  [x]  Medications  [x]  Old records  []  Other:   Procedures     Impression/Plan:  1.  Essential hypertension controlled: Continue lisinopril 20 mg once a day.  Monitor blood pressure regularly.  Blood pressure well controlled at home.  He is able to tolerate lisinopril.  2.  Hyperlipidemia: Continue Crestor 5 mg once a day.  Able to tolerate Crestor with no side effects.  3.  Presence of BIV/ICD:  BIV/ICD check shows ICD is functioning normally.  4.  Palpitations/tachycardia stable: Continue metoprolol ER 50 mg once a day.  5.  Chronic diastolic heart failure: Continue Lasix 40 mg once a day.  Low-salt diet advised.            Mariano Mccray MD   04/11/22   12:22 EDT

## 2022-04-11 ENCOUNTER — OFFICE VISIT (OUTPATIENT)
Dept: CARDIOLOGY | Facility: CLINIC | Age: 72
End: 2022-04-11

## 2022-04-11 ENCOUNTER — CLINICAL SUPPORT NO REQUIREMENTS (OUTPATIENT)
Dept: CARDIOLOGY | Facility: CLINIC | Age: 72
End: 2022-04-11

## 2022-04-11 VITALS
HEART RATE: 86 BPM | HEIGHT: 69 IN | BODY MASS INDEX: 27.25 KG/M2 | WEIGHT: 184 LBS | DIASTOLIC BLOOD PRESSURE: 69 MMHG | SYSTOLIC BLOOD PRESSURE: 127 MMHG

## 2022-04-11 DIAGNOSIS — Z95.810 PRESENCE OF BIVENTRICULAR IMPLANTABLE CARDIOVERTER-DEFIBRILLATOR (ICD): ICD-10-CM

## 2022-04-11 DIAGNOSIS — Z95.810 PRESENCE OF IMPLANTABLE CARDIOVERTER-DEFIBRILLATOR (ICD): Primary | ICD-10-CM

## 2022-04-11 DIAGNOSIS — E78.2 HYPERLIPEMIA, MIXED: ICD-10-CM

## 2022-04-11 DIAGNOSIS — I44.2 CHB (COMPLETE HEART BLOCK): Primary | ICD-10-CM

## 2022-04-11 PROCEDURE — 93284 PRGRMG EVAL IMPLANTABLE DFB: CPT | Performed by: SPECIALIST

## 2022-04-11 PROCEDURE — 99214 OFFICE O/P EST MOD 30 MIN: CPT | Performed by: SPECIALIST

## 2022-04-11 NOTE — PROGRESS NOTES
Normal BI-V ICD  Device Interrogation and Device Testing.  Normal evaluation of device function and lead measurements.  No optimization was needed of parameters or maximization of device longevity.  Patient is on automated Home Remote Monitoring.

## 2022-04-13 ENCOUNTER — TELEPHONE (OUTPATIENT)
Dept: NEUROSURGERY | Facility: CLINIC | Age: 72
End: 2022-04-13

## 2022-04-13 NOTE — TELEPHONE ENCOUNTER
Spoke with patient to notify that we received a letter stating he could not be cleared for surgery at this time. Patient will need follow up with Dr. Adrienne jung. He states he has an appointment coming up and will call us after.

## 2023-03-02 ENCOUNTER — HOSPITAL ENCOUNTER (OUTPATIENT)
Dept: OTHER | Facility: HOSPITAL | Age: 73
Discharge: HOME | End: 2023-03-02

## 2023-03-29 ENCOUNTER — TELEPHONE (OUTPATIENT)
Dept: CARDIOLOGY | Facility: CLINIC | Age: 73
End: 2023-03-29
Payer: MEDICARE

## 2023-03-29 NOTE — TELEPHONE ENCOUNTER
Caller: Zain Suh    Relationship to patient: Self    Best call back number: [phone]    Type of visit: F/U    Requested date: AS ADVISED     If rescheduling, when is the original appointment: 06.22.23    Additional notes: SAW DR CASTANEDA ([facility]) MOST RECENTLY, WOULD LIKE TO GET BACK TO DR TEJADA.  LAST VISIT WAS 04.11.22. SCHEDULED FOR F/U ON 06.22.23. ACCOMPANYING DEVICE CHECK NOT AVAILABLE AT [place] OFFICE BUT PATIENT APPROVED WITH THIS.    PATIENT'S WIFE ADNIEL SUH IS SCHEDULED FOR 04.03.23 AT 1:45 AT [place]. IF POSSIBLE, CAN BOTH BE SEEN ON 04.03.23?  PATIENT'S WIFE STATES HE IS NOT DOING WELL. HUB SCHEDULED DANIEL IN LAST OPEN SPOT.

## 2023-03-29 NOTE — TELEPHONE ENCOUNTER
Called pt there are no openings for the same day as spouses apt. Advised pt to call to see if there where any cancellations

## 2023-06-17 NOTE — PROGRESS NOTES
[facility]  Cardiology progress Note    Patient Name: Zain Fonseca  : 1950    CHIEF COMPLAINT  Hypertension        Subjective   Subjective     HISTORY OF PRESENT ILLNESS    Zain Fonseca is a 73 y.o. male with history of hypertension and ICD.  No ICD shock.  Also has shortness of breath on minimal exertion.  He has history of COPD and sarcoidosis.    REVIEW OF SYSTEMS    Constitutional:    No fever, no weight loss  Skin:     No rash  Otolaryngeal:    No difficulty swallowing  Cardiovascular: See HPI.  Pulmonary:    No cough, no sputum production    Personal History     Social History:    reports that he has never smoked. He has never used smokeless tobacco. He reports that he does not currently use alcohol. He reports that he does not use drugs.    Home Medications:  Current Outpatient Medications on File Prior to Visit   Medication Sig    clindamycin (CLEOCIN T) 1 % swab     cyanocobalamin 1000 MCG/ML injection INJECT 1 MILLILITER IN THE MUSCLE EVERY MONTH    doxazosin (CARDURA) 8 MG tablet     doxycycline (VIBRAMYICN) 100 MG tablet 1 tablet    Dulera 100-5 MCG/ACT inhaler     enoxaparin (LOVENOX) 80 MG/0.8ML solution syringe 1 mg/kg.    fluconazole (DIFLUCAN) 100 MG tablet fluconazole 100 mg oral tablet take 1 tablet (100 mg) by oral route once daily   Active    fluticasone (FLONASE) 50 MCG/ACT nasal spray     furosemide (LASIX) 20 MG tablet Take 1 tablet by mouth Daily. (Patient taking differently: Take 40 mg by mouth Daily.)    gabapentin (NEURONTIN) 300 MG capsule Take 300 mg by mouth 5 (Five) Times a Day.    levothyroxine (SYNTHROID, LEVOTHROID) 100 MCG tablet Take 100 mcg by mouth Daily.    lisinopril (PRINIVIL,ZESTRIL) 20 MG tablet     magnesium oxide (MAG-OX) 400 MG tablet Take 250 mg by mouth 2 (Two) Times a Day.    methocarbamol (ROBAXIN) 750 MG tablet Take 750 mg by mouth 4 (Four) Times a Day.    metoprolol succinate XL (TOPROL-XL) 50 MG 24 hr tablet Toprol XL 50 mg oral tablet  extended release 24 hr take 1 tablet (50 mg) by oral route once daily   Active    modafinil (PROVIGIL) 200 MG tablet Take 200 mg by mouth.    omeprazole (priLOSEC) 20 MG capsule Take 20 mg by mouth.    pentoxifylline (TRENtal) 400 MG CR tablet Take 400 mg by mouth 3 (Three) Times a Day With Meals.    potassium chloride 10 MEQ CR tablet Take 10 mEq by mouth Every Other Day.    predniSONE (DELTASONE) 5 MG tablet Take 5 mg by mouth 5 (Five) Times a Day. 3 tabs in morning and 2 tabs in pm  Equals 25 mg daily    pregabalin (LYRICA) 100 MG capsule Take 100 mg by mouth 2 (Two) Times a Day.    rosuvastatin (CRESTOR) 5 MG tablet Take 5 mg by mouth every night at bedtime.    Testosterone Cypionate (DEPOTESTOTERONE CYPIONATE) 200 MG/ML injection INJECT 0.25 MLS INTO THE MUSCLE EVERY WEEK    triamterene-hydrochlorothiazide (MAXZIDE) 75-50 MG per tablet triamterene-hydrochlorothiazid 75-50 mg oral tablet take 1 tablet by oral route once daily   Active    vitamin D3 125 MCG (5000 UT) capsule capsule Take  by mouth Daily.    zolpidem (AMBIEN) 10 MG tablet Take 10 mg by mouth At Night As Needed.     No current facility-administered medications on file prior to visit.       Past Medical History:   Diagnosis Date    Abnormal ECG     Chronic kidney disease     Coronary artery disease     Deep vein thrombosis     Hyperlipidemia     Hypertension     Pulmonary embolism     Sarcoidosis of central nervous system        Allergies:  Allergies   Allergen Reactions    Amiodarone Other (See Comments) and Shortness Of Breath     Patient unable to articulate exact reaction.   Patient unable to articulate exact reaction.       Azathioprine Other (See Comments) and Swelling     ulcers  Other reaction(s): Other (See Comments)  ulcers  Leg ulcers, extreme arthitis type symptoms all over body  ulcers  ulcers  Leg ulcers, extreme arthitis type symptoms all over body      Hydroxychloroquine Other (See Comments)     Other reaction(s): Other (See  Comments)  Retina toxcity  Retina toxcity  Other reaction(s): Other (See Comments)  Retina toxcity  Retina toxcity      Methotrexate Derivatives Diarrhea, GI Intolerance and Nausea And Vomiting     Other reaction(s): GI Upset      Terazosin Other (See Comments)    Tolmetin Itching and Rash    Nsaids Rash       Objective    Objective       Vitals:   BP: ()/()   Arterial Line BP: ()/()   There is no height or weight on file to calculate BMI.     PHYSICAL EXAM:    General Appearance:   well developed  well nourished  HENT:   oropharynx moist  lips not cyanotic  Neck:  thyroid not enlarged  supple  Respiratory:  no respiratory distress  normal breath sounds  no rales  Cardiovascular:  no jugular venous distention  regular rhythm  apical impulse normal  S1 normal, S2 normal  no S3, no S4   no murmur  no rub, no thrill  carotid pulses normal; no bruit  pedal pulses normal  lower extremity edema: none    Skin:   warm, dry  Psychiatric:  judgement and insight appropriate  normal mood and affect        Result Review:  I have personally reviewed the available results from  [x]  Laboratory  [x]  EKG  [x]  Cardiology  [x]  Medications  [x]  Old records  []  Other:     Procedures       Impression/Plan:  1.  Essential hypertension controlled: Continue lisinopril 20 mg once a day.  Monitor blood pressure regularly.  Blood pressure well controlled at home.  He is able to tolerate lisinopril.  2.  Hyperlipidemia: Continue Crestor 5 mg once a day.  Able to tolerate Crestor with no side effects.  3.  Presence of BIV/ICD: BIV/ICD check shows ICD is functioning normally.  Recheck BiV ICD and adjust if needed   4.  Palpitations/tachycardia stable: Continue metoprolol ER 50 mg once a day.  5.  Chronic diastolic heart failure: Continue Lasix 40 mg once a day.  Low-salt diet advised.  BMP and BNP           Mariano Mccray MD   06/17/23   09:47 EDT

## 2023-06-19 ENCOUNTER — OFFICE VISIT (OUTPATIENT)
Dept: CARDIOLOGY | Facility: CLINIC | Age: 73
End: 2023-06-19
Payer: MEDICARE

## 2023-06-19 VITALS
HEART RATE: 86 BPM | SYSTOLIC BLOOD PRESSURE: 122 MMHG | BODY MASS INDEX: 26.81 KG/M2 | HEIGHT: 69 IN | WEIGHT: 181 LBS | DIASTOLIC BLOOD PRESSURE: 60 MMHG

## 2023-06-19 DIAGNOSIS — I50.32 DIASTOLIC CHF, CHRONIC: ICD-10-CM

## 2023-06-19 DIAGNOSIS — I10 HYPERTENSION, ESSENTIAL: Primary | ICD-10-CM

## 2023-06-19 DIAGNOSIS — Z95.810 PRESENCE OF IMPLANTABLE CARDIOVERTER-DEFIBRILLATOR (ICD): ICD-10-CM

## 2024-02-21 DIAGNOSIS — I50.32 DIASTOLIC CHF, CHRONIC: ICD-10-CM

## 2024-03-12 ENCOUNTER — HOSPITAL ENCOUNTER (OUTPATIENT)
Dept: OTHER | Facility: HOSPITAL | Age: 74
Discharge: HOME | End: 2024-03-12

## 2024-06-18 ENCOUNTER — OFFICE VISIT (OUTPATIENT)
Dept: NEUROSURGERY | Facility: CLINIC | Age: 74
End: 2024-06-18
Payer: MEDICARE

## 2024-06-18 VITALS
BODY MASS INDEX: 25.03 KG/M2 | DIASTOLIC BLOOD PRESSURE: 75 MMHG | WEIGHT: 169 LBS | HEIGHT: 69 IN | SYSTOLIC BLOOD PRESSURE: 139 MMHG

## 2024-06-18 DIAGNOSIS — M48.062 SPINAL STENOSIS OF LUMBAR REGION WITH NEUROGENIC CLAUDICATION: Primary | ICD-10-CM

## 2024-06-18 PROCEDURE — 3075F SYST BP GE 130 - 139MM HG: CPT | Performed by: NEUROLOGICAL SURGERY

## 2024-06-18 PROCEDURE — 99214 OFFICE O/P EST MOD 30 MIN: CPT | Performed by: NEUROLOGICAL SURGERY

## 2024-06-18 PROCEDURE — 3078F DIAST BP <80 MM HG: CPT | Performed by: NEUROLOGICAL SURGERY

## 2024-06-18 RX ORDER — OXYCODONE AND ACETAMINOPHEN 10; 325 MG/1; MG/1
1 TABLET ORAL EVERY 6 HOURS PRN
COMMUNITY

## 2024-06-18 NOTE — PROGRESS NOTES
Zain Fonseca is a 74 y.o. male that presents with Back Pain       He has trouble getting around. He has pain across the belt line and into his hips. The pain doesn't tend to go below the knees. He gets some relief with lying down with his feet elevated. He has moderate spinal stenosis at L4-5. He is still on Percocet . His worst pain is across the lower back. He had a CT scan secondary to his pacemaker.     Back Pain      Review of Systems   Musculoskeletal:  Positive for back pain and myalgias.        Vitals:    06/18/24 1338   BP: 139/75        Physical Exam  Constitutional:       Appearance: He is normal weight.   Pulmonary:      Effort: Pulmonary effort is normal.   Neurological:      Mental Status: He is alert.      Motor: No weakness.   Psychiatric:         Mood and Affect: Mood normal.             Assessment and Plan {CC Problem List  Visit Diagnosis  ROS  Review (Popup)  Health Maintenance  Quality  BestPractice  Medications  SmartSets  SnapShot Encounters  Media :23}   Problem List Items Addressed This Visit    None  Visit Diagnoses       Spinal stenosis of lumbar region with neurogenic claudication    -  Primary        He has so many issue including peripheral neuropathy, knee arthritis, hip pain, and DDD. Surgery would not help pain across the lower back.    I would put surgery at less than 50% chance of helping the leg pain.     He could potentially benefit from an intrathecal pump. He will discuss this with Dr. Lazo.     Follow Up {Instructions Charge Capture  Follow-up Communications :23}   No follow-ups on file.

## 2024-07-22 ENCOUNTER — TELEPHONE (OUTPATIENT)
Dept: GASTROENTEROLOGY | Facility: CLINIC | Age: 74
End: 2024-07-22

## 2024-07-22 NOTE — TELEPHONE ENCOUNTER
Returning pt call about canceling the 1PM appt for today per pt request, and rescheduling for tomorrow 7/23 @830AM. Pt is agreeable with this appt date and time.